# Patient Record
Sex: FEMALE | Race: WHITE | NOT HISPANIC OR LATINO | Employment: OTHER | URBAN - METROPOLITAN AREA
[De-identification: names, ages, dates, MRNs, and addresses within clinical notes are randomized per-mention and may not be internally consistent; named-entity substitution may affect disease eponyms.]

---

## 2017-08-25 ENCOUNTER — TELEPHONE (OUTPATIENT)
Dept: INTERNAL MEDICINE | Facility: CLINIC | Age: 82
End: 2017-08-25

## 2017-08-25 NOTE — TELEPHONE ENCOUNTER
April calling to verify that rx was called in for pt for xanax. Pt has a gap in coverage that family states that Dr. Talley has been filling xanax. Advised April that Dr. Talley hasn't refilled any medication since 9-13

## 2017-08-25 NOTE — TELEPHONE ENCOUNTER
----- Message from Christel Figueroa sent at 8/25/2017 12:13 PM CDT -----  Contact: DR SANDHYA NG office/april/[phone]  Nurse called in regards to talking to the office about the pt.      Please advise